# Patient Record
(demographics unavailable — no encounter records)

---

## 2025-05-12 NOTE — HISTORY OF PRESENT ILLNESS
[FreeTextEntry1] : 27 y/o p3003 LMP 3/2/25, here for wwe and evaluation of secondary amenorrhea,.  no bleeding ,pain or discharge.  nsd x 3, largest 7 lbs  no med hx  T&A  nkda  non smoker  dy neg, declines expanded carrier testing

## 2025-05-12 NOTE — PROCEDURE
[Transvaginal OB Sonogram] : Transvaginal OB Sonogram [Intrauterine Pregnancy] : intrauterine pregnancy [Yolk Sac] : yolk sac present [Fetal Heart] : fetal heart present [Transvaginal OB Sonogram WNL] : Transvaginal OB Sonogram WNL [FreeTextEntry1] : twin viable iup crl 9 and 9.1 wks, no ff, no masses,, appear in separate sacs, fredis by sono 12/14/25

## 2025-05-12 NOTE — PHYSICAL EXAM
[MA] : MA [Appropriately responsive] : appropriately responsive [Alert] : alert [No Acute Distress] : no acute distress [No Lymphadenopathy] : no lymphadenopathy [Soft] : soft [Non-tender] : non-tender [Non-distended] : non-distended [No HSM] : No HSM [No Lesions] : no lesions [No Mass] : no mass [Oriented x3] : oriented x3 [Examination Of The Breasts] : a normal appearance [No Masses] : no breast masses were palpable [Labia Majora] : normal [Labia Minora] : normal [Normal] : normal [Uterine Adnexae] : normal [FreeTextEntry2] : Maria Ines

## 2025-05-12 NOTE — PLAN
[FreeTextEntry1] : 27 y/o p3 with normal exam and secondary amenorrhea and twins stable ref aneuploidy testing nut, counselling labs sent from office nestabs discussed early sono precautions